# Patient Record
Sex: MALE | Race: OTHER | HISPANIC OR LATINO | Employment: FULL TIME | URBAN - METROPOLITAN AREA
[De-identification: names, ages, dates, MRNs, and addresses within clinical notes are randomized per-mention and may not be internally consistent; named-entity substitution may affect disease eponyms.]

---

## 2022-02-25 ENCOUNTER — OFFICE VISIT (OUTPATIENT)
Dept: GASTROENTEROLOGY | Facility: CLINIC | Age: 47
End: 2022-02-25

## 2022-02-25 VITALS
HEART RATE: 78 BPM | WEIGHT: 177 LBS | HEIGHT: 66 IN | DIASTOLIC BLOOD PRESSURE: 82 MMHG | SYSTOLIC BLOOD PRESSURE: 122 MMHG | BODY MASS INDEX: 28.45 KG/M2

## 2022-02-25 DIAGNOSIS — K62.89 RECTAL PAIN: ICD-10-CM

## 2022-02-25 DIAGNOSIS — R39.16 BENIGN PROSTATIC HYPERPLASIA (BPH) WITH STRAINING ON URINATION: Primary | ICD-10-CM

## 2022-02-25 DIAGNOSIS — Z12.11 SCREENING FOR MALIGNANT NEOPLASM OF COLON: ICD-10-CM

## 2022-02-25 DIAGNOSIS — R10.9 ABDOMINAL CRAMPING: ICD-10-CM

## 2022-02-25 DIAGNOSIS — N40.1 BENIGN PROSTATIC HYPERPLASIA (BPH) WITH STRAINING ON URINATION: Primary | ICD-10-CM

## 2022-02-25 PROCEDURE — 99204 OFFICE O/P NEW MOD 45 MIN: CPT | Performed by: PHYSICIAN ASSISTANT

## 2022-02-25 RX ORDER — DICYCLOMINE HCL 20 MG
20 TABLET ORAL EVERY 6 HOURS PRN
Qty: 60 TABLET | Refills: 2 | Status: SHIPPED | OUTPATIENT
Start: 2022-02-25

## 2022-02-25 NOTE — H&P (VIEW-ONLY)
[address] [facility]  Sharon Rell 56 y o  male MRN: [id number]       CC: Change in bowel habits, rectal pain    HPI: Manjit Patricio is a 42-year-old male who presents to the office for change in bowel habits and rectal pain that began about 4-5 months ago  Patient reports that he has noticed that it is harder for him to have a bowel movement, he will often have straining on a daily basis  Patient reports that he is surprised that he has issues with constipation since he eats mostly a plant based diet, and even tried including Metamucil in to his regimen  He denies signs of overt GI bleeding currently  He has associated abdominal cramping  To his knowledge, there is no family history of colon cancer  He denies heartburn, regurgitation, dysphagia or odynophagia  Patient has never had an EGD or colonoscopy  Review of Systems:    CONSTITUTIONAL: Denies any fever, chills, or rigors  Good appetite, and no recent weight loss  HEENT: No earache or tinnitus  Denies hearing loss or visual disturbances  CARDIOVASCULAR: No chest pain or palpitations  RESPIRATORY: Denies any cough, hemoptysis, shortness of breath or dyspnea on exertion  GASTROINTESTINAL: As noted in the History of Present Illness  GENITOURINARY:   Positive for interruptions of urination mid stream   NEUROLOGIC: No dizziness or vertigo, denies headaches  MUSCULOSKELETAL: Denies any muscle or joint pain  SKIN: Denies skin rashes or itching  ENDOCRINE: Denies excessive thirst  Denies intolerance to heat or cold  PSYCHOSOCIAL: Denies depression or anxiety  Denies any recent memory loss  Current Outpatient Medications   Medication Sig Dispense Refill    dicyclomine (BENTYL) 20 mg tablet Take 1 tablet (20 mg total) by mouth every 6 (six) hours as needed (abdominal cramping) 60 tablet 2     No current facility-administered medications for this visit  History reviewed  No pertinent past medical history  History reviewed   No pertinent surgical history  Social History     Socioeconomic History    Marital status:      Spouse name: None    Number of children: None    Years of education: None    Highest education level: None   Occupational History    None   Tobacco Use    Smoking status: Never Smoker    Smokeless tobacco: Never Used   Vaping Use    Vaping Use: Never used   Substance and Sexual Activity    Alcohol use: Yes     Comment: occassionly    Drug use: Never    Sexual activity: None   Other Topics Concern    None   Social History Narrative    None     Social Determinants of Health     Financial Resource Strain: Not on file   Food Insecurity: Not on file   Transportation Needs: Not on file   Physical Activity: Not on file   Stress: Not on file   Social Connections: Not on file   Intimate Partner Violence: Not on file   Housing Stability: Not on file     History reviewed  No pertinent family history  PHYSICAL EXAM:    Vitals:    02/25/22 1524   BP: 122/82   Pulse: 78   Weight: 80 3 kg (177 lb)   Height: 5' 6" (1 676 m)     General Appearance:   Alert and oriented x 3  Cooperative, and in no respiratory distress   HEENT:   Normocephalic, atraumatic, anicteric      Neck:  Supple, symmetrical, trachea midline   Lungs:   Clear to auscultation bilaterally    Heart[de-identified]   Regular rate and rhythm   Abdomen:   Soft, non-tender, non-distended; normal bowel sounds; no masses, no organomegaly    Genitalia:   Deferred    Rectal:   Deferred    Extremities:  No cyanosis, clubbing or edema    Pulses:  2+ and symmetric all extremities    Skin:  Skin color, texture, turgor normal, no rashes or lesions    Lymph nodes:  No palpable cervical or supraclavicular lymphadenopathy        Lab Results:             Invalid input(s): LABALBU            Imaging Studies: I have personally reviewed pertinent imaging studies  Patient was never admitted      ASSESSMENT and PLAN:      1)   Need for screening colonoscopy, rectal pain and change in bowel habits -  We went over how colonoscopy is performed, risks and benefits  Given his change in bowel habits and age above 39, he should have a colonoscopy, and agrees  -  Colonoscopy to investigate  -  Advised patient to trial MiraLax on an as-needed basis  -  Further recommendations based on above    2)  Symptoms suspicious for BPH -  Patient reports that his PCP had examine his prostate, he was told that it was not enlarged  I do not see a prior record of a PSA  He has straining with urination interruptions when he is urinating  I will refer him to urology  Follow up after colonoscopy

## 2022-02-25 NOTE — PATIENT INSTRUCTIONS
Scheduled date of colonoscopy (as of today):3/18/22  Physician performing colonoscopy: Bobby  Location of colonoscopy:[place]  Bowel prep reviewed with patient:miralax/dulcolax  Instructions reviewed with patient by:Liseth TANG  Clearances: none

## 2022-02-26 ENCOUNTER — APPOINTMENT (OUTPATIENT)
Dept: LAB | Facility: MEDICAL CENTER | Age: 47
End: 2022-02-26
Payer: COMMERCIAL

## 2022-02-26 DIAGNOSIS — Z00.01 ENCOUNTER FOR GENERAL ADULT MEDICAL EXAMINATION WITH ABNORMAL FINDINGS: ICD-10-CM

## 2022-02-26 LAB
ALBUMIN SERPL BCP-MCNC: 3.8 G/DL (ref 3.5–5)
ALP SERPL-CCNC: 83 U/L (ref 46–116)
ALT SERPL W P-5'-P-CCNC: 51 U/L (ref 12–78)
ANION GAP SERPL CALCULATED.3IONS-SCNC: 4 MMOL/L (ref 4–13)
AST SERPL W P-5'-P-CCNC: 28 U/L (ref 5–45)
BASOPHILS # BLD AUTO: 0.03 THOUSANDS/ΜL (ref 0–0.1)
BASOPHILS NFR BLD AUTO: 1 % (ref 0–1)
BILIRUB SERPL-MCNC: 0.85 MG/DL (ref 0.2–1)
BUN SERPL-MCNC: 14 MG/DL (ref 5–25)
CALCIUM SERPL-MCNC: 9.3 MG/DL (ref 8.3–10.1)
CHLORIDE SERPL-SCNC: 109 MMOL/L (ref 100–108)
CHOLEST SERPL-MCNC: 194 MG/DL
CO2 SERPL-SCNC: 28 MMOL/L (ref 21–32)
CREAT SERPL-MCNC: 1.04 MG/DL (ref 0.6–1.3)
EOSINOPHIL # BLD AUTO: 0.18 THOUSAND/ΜL (ref 0–0.61)
EOSINOPHIL NFR BLD AUTO: 3 % (ref 0–6)
ERYTHROCYTE [DISTWIDTH] IN BLOOD BY AUTOMATED COUNT: 12.7 % (ref 11.6–15.1)
GFR SERPL CREATININE-BSD FRML MDRD: 85 ML/MIN/1.73SQ M
GLUCOSE P FAST SERPL-MCNC: 90 MG/DL (ref 65–99)
HCT VFR BLD AUTO: 47.6 % (ref 36.5–49.3)
HCV AB SER QL: NORMAL
HDLC SERPL-MCNC: 45 MG/DL
HGB BLD-MCNC: 15.6 G/DL (ref 12–17)
IMM GRANULOCYTES # BLD AUTO: 0.01 THOUSAND/UL (ref 0–0.2)
IMM GRANULOCYTES NFR BLD AUTO: 0 % (ref 0–2)
LDLC SERPL CALC-MCNC: 129 MG/DL (ref 0–100)
LYMPHOCYTES # BLD AUTO: 2.27 THOUSANDS/ΜL (ref 0.6–4.47)
LYMPHOCYTES NFR BLD AUTO: 37 % (ref 14–44)
MCH RBC QN AUTO: 30.5 PG (ref 26.8–34.3)
MCHC RBC AUTO-ENTMCNC: 32.8 G/DL (ref 31.4–37.4)
MCV RBC AUTO: 93 FL (ref 82–98)
MONOCYTES # BLD AUTO: 0.38 THOUSAND/ΜL (ref 0.17–1.22)
MONOCYTES NFR BLD AUTO: 6 % (ref 4–12)
NEUTROPHILS # BLD AUTO: 3.21 THOUSANDS/ΜL (ref 1.85–7.62)
NEUTS SEG NFR BLD AUTO: 53 % (ref 43–75)
NONHDLC SERPL-MCNC: 149 MG/DL
NRBC BLD AUTO-RTO: 0 /100 WBCS
PLATELET # BLD AUTO: 276 THOUSANDS/UL (ref 149–390)
PMV BLD AUTO: 10.5 FL (ref 8.9–12.7)
POTASSIUM SERPL-SCNC: 4.1 MMOL/L (ref 3.5–5.3)
PROT SERPL-MCNC: 8 G/DL (ref 6.4–8.2)
PSA SERPL-MCNC: 0.7 NG/ML (ref 0–4)
RBC # BLD AUTO: 5.12 MILLION/UL (ref 3.88–5.62)
SODIUM SERPL-SCNC: 141 MMOL/L (ref 136–145)
TRIGL SERPL-MCNC: 98 MG/DL
TSH SERPL DL<=0.05 MIU/L-ACNC: 1.02 UIU/ML (ref 0.36–3.74)
WBC # BLD AUTO: 6.08 THOUSAND/UL (ref 4.31–10.16)

## 2022-02-26 PROCEDURE — 36415 COLL VENOUS BLD VENIPUNCTURE: CPT

## 2022-02-26 PROCEDURE — 85025 COMPLETE CBC W/AUTO DIFF WBC: CPT

## 2022-02-26 PROCEDURE — G0103 PSA SCREENING: HCPCS

## 2022-02-26 PROCEDURE — 80061 LIPID PANEL: CPT

## 2022-02-26 PROCEDURE — 86803 HEPATITIS C AB TEST: CPT

## 2022-02-26 PROCEDURE — 84443 ASSAY THYROID STIM HORMONE: CPT

## 2022-02-26 PROCEDURE — 80053 COMPREHEN METABOLIC PANEL: CPT

## 2022-03-17 ENCOUNTER — NURSE TRIAGE (OUTPATIENT)
Dept: OTHER | Facility: OTHER | Age: 47
End: 2022-03-17

## 2022-03-17 NOTE — TELEPHONE ENCOUNTER
Pt wanted to review colonoscopy prep was able to review prep and answer question  patient expressed understand

## 2022-03-17 NOTE — TELEPHONE ENCOUNTER
Reason for Disposition   Question about upcoming scheduled test, no triage required and triager able to answer question    Answer Assessment - Initial Assessment Questions  1   REASON FOR CALL or QUESTION: "What is your reason for calling today?" or "How can I best help you?" or "What question do you have that I can help answer?"      Colonoscopy  Prep instruction    Protocols used: INFORMATION ONLY CALL - NO TRIAGE-ADULT-

## 2022-03-18 ENCOUNTER — HOSPITAL ENCOUNTER (OUTPATIENT)
Dept: GASTROENTEROLOGY | Facility: HOSPITAL | Age: 47
Setting detail: OUTPATIENT SURGERY
Discharge: HOME | End: 2022-03-18
Admitting: INTERNAL MEDICINE
Payer: COMMERCIAL

## 2022-03-18 ENCOUNTER — ANESTHESIA EVENT (OUTPATIENT)
Dept: GASTROENTEROLOGY | Facility: HOSPITAL | Age: 47
End: 2022-03-18

## 2022-03-18 ENCOUNTER — ANESTHESIA (OUTPATIENT)
Dept: GASTROENTEROLOGY | Facility: HOSPITAL | Age: 47
End: 2022-03-18

## 2022-03-18 VITALS
HEART RATE: 78 BPM | TEMPERATURE: 98.5 F | SYSTOLIC BLOOD PRESSURE: 81 MMHG | OXYGEN SATURATION: 97 % | DIASTOLIC BLOOD PRESSURE: 49 MMHG | RESPIRATION RATE: 16 BRPM

## 2022-03-18 DIAGNOSIS — Z12.11 SCREENING FOR MALIGNANT NEOPLASM OF COLON: ICD-10-CM

## 2022-03-18 DIAGNOSIS — K62.89 RECTAL PAIN: ICD-10-CM

## 2022-03-18 PROCEDURE — 45378 DIAGNOSTIC COLONOSCOPY: CPT | Performed by: INTERNAL MEDICINE

## 2022-03-18 RX ORDER — SODIUM CHLORIDE, SODIUM LACTATE, POTASSIUM CHLORIDE, CALCIUM CHLORIDE 600; 310; 30; 20 MG/100ML; MG/100ML; MG/100ML; MG/100ML
125 INJECTION, SOLUTION INTRAVENOUS CONTINUOUS
Status: CANCELLED | OUTPATIENT
Start: 2022-03-18

## 2022-03-18 RX ORDER — PROPOFOL 10 MG/ML
INJECTION, EMULSION INTRAVENOUS AS NEEDED
Status: DISCONTINUED | OUTPATIENT
Start: 2022-03-18 | End: 2022-03-18

## 2022-03-18 RX ORDER — LIDOCAINE HYDROCHLORIDE 10 MG/ML
INJECTION, SOLUTION EPIDURAL; INFILTRATION; INTRACAUDAL; PERINEURAL AS NEEDED
Status: DISCONTINUED | OUTPATIENT
Start: 2022-03-18 | End: 2022-03-18

## 2022-03-18 RX ORDER — SODIUM CHLORIDE, SODIUM LACTATE, POTASSIUM CHLORIDE, CALCIUM CHLORIDE 600; 310; 30; 20 MG/100ML; MG/100ML; MG/100ML; MG/100ML
INJECTION, SOLUTION INTRAVENOUS CONTINUOUS PRN
Status: DISCONTINUED | OUTPATIENT
Start: 2022-03-18 | End: 2022-03-18

## 2022-03-18 RX ORDER — LIDOCAINE HYDROCHLORIDE 10 MG/ML
0.5 INJECTION, SOLUTION EPIDURAL; INFILTRATION; INTRACAUDAL; PERINEURAL ONCE AS NEEDED
Status: CANCELLED | OUTPATIENT
Start: 2022-03-18

## 2022-03-18 RX ADMIN — PROPOFOL 120 MG: 10 INJECTION, EMULSION INTRAVENOUS at 11:25

## 2022-03-18 RX ADMIN — SODIUM CHLORIDE, SODIUM LACTATE, POTASSIUM CHLORIDE, AND CALCIUM CHLORIDE: .6; .31; .03; .02 INJECTION, SOLUTION INTRAVENOUS at 11:12

## 2022-03-18 RX ADMIN — PROPOFOL 40 MG: 10 INJECTION, EMULSION INTRAVENOUS at 11:28

## 2022-03-18 RX ADMIN — LIDOCAINE HYDROCHLORIDE 50 MG: 10 INJECTION, SOLUTION EPIDURAL; INFILTRATION; INTRACAUDAL; PERINEURAL at 11:25

## 2022-03-18 RX ADMIN — PROPOFOL 40 MG: 10 INJECTION, EMULSION INTRAVENOUS at 11:30

## 2022-03-18 NOTE — INTERVAL H&P NOTE
H&P reviewed  After examining the patient I find no changes in the patients condition since the H&P had been written      Vitals:    03/18/22 1038   BP: 111/72   Pulse: 80   Resp: 16   Temp: 97 6 °F (36 4 °C)   SpO2: 99%

## 2022-03-18 NOTE — ANESTHESIA POSTPROCEDURE EVALUATION
Post-Op Assessment Note    CV Status:  Stable  Pain Score: 0    Pain management: adequate     Mental Status:  Sleepy   Hydration Status:  Euvolemic   PONV Controlled:  Controlled   Airway Patency:  Patent      Post Op Vitals Reviewed: Yes      Staff: CRNA         No complications documented      BP (!) 79/45 (03/18/22 1134)    Temp 98 5 °F (36 9 °C) (03/18/22 1134)    Pulse 78 (03/18/22 1134)   Resp 12 (03/18/22 1134)    SpO2 97 % (03/18/22 1134)

## 2022-03-18 NOTE — ANESTHESIA PREPROCEDURE EVALUATION
Procedure:  COLONOSCOPY    Relevant Problems   No relevant active problems      CAD/PCI/MI/CHF - Denies  COPD/ASTHMA/GE - Denies  GERD - Denies  PROBLEMS WITH PRIOR ANESTHESIA - Denies  NPO STATUS - Patient reports last solid PO intake at 6 PM on Wednesday, and last clear liquid PO intake at 11 PM last night  Lab Results   Component Value Date    WBC 6 08 02/26/2022    HGB 15 6 02/26/2022    HCT 47 6 02/26/2022    MCV 93 02/26/2022     02/26/2022     Lab Results   Component Value Date    CALCIUM 9 3 02/26/2022    K 4 1 02/26/2022    CO2 28 02/26/2022     (H) 02/26/2022    BUN 14 02/26/2022    CREATININE 1 04 02/26/2022       Physical Exam    Airway    Mallampati score: II  TM Distance: >3 FB  Neck ROM: full     Dental   Comment: Multiple loose and missing teeth,     Cardiovascular  Rhythm: regular,     Pulmonary  Pulmonary exam normal     Other Findings        Anesthesia Plan  ASA Score- 1     Anesthesia Type- IV sedation with anesthesia with ASA Monitors  Additional Monitors:   Airway Plan:           Plan Factors-Exercise tolerance (METS): >4 METS  Chart reviewed  Patient is not a current smoker  Obstructive sleep apnea risk education given perioperatively  Induction- intravenous  Postoperative Plan-     Informed Consent- Anesthetic plan and risks discussed with patient  I personally reviewed this patient with the CRNA  Discussed and agreed on the Anesthesia Plan with the CRNA  Cayla Garcia MD, personally examined the patient, reviewed the patient history and laboratory data, and explained the risks/benefits of the anesthetic to the patient  The patient has signed the appropriate consents and is ready to proceed